# Patient Record
(demographics unavailable — no encounter records)

---

## 2021-10-17 NOTE — NUR
1Y 11M OLD MALE BIB MOTHER C/O NOSE BLEEDING, ABRASION WOUND & BUMP AT FORE 
HEAD S/P FELL OFF A CHAIR  TO  CONCREATE  APROX  6 INCHES X 15 MINS AGO. DENIES 
LOC. DENIES N/V, DENIES FEVER/CHILLS.



DENIES PMH



NKA